# Patient Record
Sex: FEMALE | Race: WHITE | HISPANIC OR LATINO | Employment: STUDENT | URBAN - METROPOLITAN AREA
[De-identification: names, ages, dates, MRNs, and addresses within clinical notes are randomized per-mention and may not be internally consistent; named-entity substitution may affect disease eponyms.]

---

## 2020-07-01 ENCOUNTER — HOSPITAL ENCOUNTER (EMERGENCY)
Facility: MEDICAL CENTER | Age: 20
End: 2020-07-02
Attending: EMERGENCY MEDICINE
Payer: OTHER MISCELLANEOUS

## 2020-07-01 DIAGNOSIS — F41.9 ANXIETY: ICD-10-CM

## 2020-07-01 DIAGNOSIS — R45.851 SUICIDAL IDEATION: ICD-10-CM

## 2020-07-01 LAB
ALBUMIN SERPL BCP-MCNC: 4.5 G/DL (ref 3.2–4.9)
ALBUMIN/GLOB SERPL: 1.6 G/DL
ALP SERPL-CCNC: 87 U/L (ref 30–99)
ALT SERPL-CCNC: 21 U/L (ref 2–50)
AMPHET UR QL SCN: NEGATIVE
ANION GAP SERPL CALC-SCNC: 13 MMOL/L (ref 7–16)
AST SERPL-CCNC: 20 U/L (ref 12–45)
BARBITURATES UR QL SCN: NEGATIVE
BASOPHILS # BLD AUTO: 0.1 % (ref 0–1.8)
BASOPHILS # BLD: 0.01 K/UL (ref 0–0.12)
BENZODIAZ UR QL SCN: NEGATIVE
BILIRUB SERPL-MCNC: 0.3 MG/DL (ref 0.1–1.5)
BUN SERPL-MCNC: 18 MG/DL (ref 8–22)
BZE UR QL SCN: NEGATIVE
CALCIUM SERPL-MCNC: 9.1 MG/DL (ref 8.5–10.5)
CANNABINOIDS UR QL SCN: POSITIVE
CHLORIDE SERPL-SCNC: 104 MMOL/L (ref 96–112)
CO2 SERPL-SCNC: 23 MMOL/L (ref 20–33)
CREAT SERPL-MCNC: 0.69 MG/DL (ref 0.5–1.4)
EKG IMPRESSION: NORMAL
EOSINOPHIL # BLD AUTO: 0.1 K/UL (ref 0–0.51)
EOSINOPHIL NFR BLD: 1.2 % (ref 0–6.9)
ERYTHROCYTE [DISTWIDTH] IN BLOOD BY AUTOMATED COUNT: 43 FL (ref 35.9–50)
GLOBULIN SER CALC-MCNC: 2.8 G/DL (ref 1.9–3.5)
GLUCOSE SERPL-MCNC: 120 MG/DL (ref 65–99)
HCG UR QL: NEGATIVE
HCT VFR BLD AUTO: 42.1 % (ref 37–47)
HGB BLD-MCNC: 13.7 G/DL (ref 12–16)
IMM GRANULOCYTES # BLD AUTO: 0.02 K/UL (ref 0–0.11)
IMM GRANULOCYTES NFR BLD AUTO: 0.2 % (ref 0–0.9)
LYMPHOCYTES # BLD AUTO: 1.92 K/UL (ref 1–4.8)
LYMPHOCYTES NFR BLD: 23.6 % (ref 22–41)
MCH RBC QN AUTO: 28.8 PG (ref 27–33)
MCHC RBC AUTO-ENTMCNC: 32.5 G/DL (ref 33.6–35)
MCV RBC AUTO: 88.4 FL (ref 81.4–97.8)
METHADONE UR QL SCN: NEGATIVE
MONOCYTES # BLD AUTO: 0.53 K/UL (ref 0–0.85)
MONOCYTES NFR BLD AUTO: 6.5 % (ref 0–13.4)
NEUTROPHILS # BLD AUTO: 5.55 K/UL (ref 2–7.15)
NEUTROPHILS NFR BLD: 68.4 % (ref 44–72)
NRBC # BLD AUTO: 0 K/UL
NRBC BLD-RTO: 0 /100 WBC
OPIATES UR QL SCN: NEGATIVE
OXYCODONE UR QL SCN: NEGATIVE
PCP UR QL SCN: NEGATIVE
PLATELET # BLD AUTO: 298 K/UL (ref 164–446)
PMV BLD AUTO: 10 FL (ref 9–12.9)
POC BREATHALIZER: 0 PERCENT (ref 0–0.01)
POTASSIUM SERPL-SCNC: 3.2 MMOL/L (ref 3.6–5.5)
PROPOXYPH UR QL SCN: NEGATIVE
PROT SERPL-MCNC: 7.3 G/DL (ref 6–8.2)
RBC # BLD AUTO: 4.76 M/UL (ref 4.2–5.4)
SODIUM SERPL-SCNC: 140 MMOL/L (ref 135–145)
TSH SERPL DL<=0.005 MIU/L-ACNC: 0.65 UIU/ML (ref 0.38–5.33)
VIT B12 SERPL-MCNC: 682 PG/ML (ref 211–911)
WBC # BLD AUTO: 8.1 K/UL (ref 4.8–10.8)

## 2020-07-01 PROCEDURE — 302970 POC BREATHALIZER

## 2020-07-01 PROCEDURE — 82607 VITAMIN B-12: CPT

## 2020-07-01 PROCEDURE — 93005 ELECTROCARDIOGRAM TRACING: CPT | Performed by: EMERGENCY MEDICINE

## 2020-07-01 PROCEDURE — 85025 COMPLETE CBC W/AUTO DIFF WBC: CPT

## 2020-07-01 PROCEDURE — 99285 EMERGENCY DEPT VISIT HI MDM: CPT

## 2020-07-01 PROCEDURE — 93005 ELECTROCARDIOGRAM TRACING: CPT

## 2020-07-01 PROCEDURE — 700102 HCHG RX REV CODE 250 W/ 637 OVERRIDE(OP): Performed by: EMERGENCY MEDICINE

## 2020-07-01 PROCEDURE — 700102 HCHG RX REV CODE 250 W/ 637 OVERRIDE(OP): Performed by: PSYCHIATRY & NEUROLOGY

## 2020-07-01 PROCEDURE — 302970 POC BREATHALIZER: Performed by: EMERGENCY MEDICINE

## 2020-07-01 PROCEDURE — A9270 NON-COVERED ITEM OR SERVICE: HCPCS | Performed by: PSYCHIATRY & NEUROLOGY

## 2020-07-01 PROCEDURE — 81025 URINE PREGNANCY TEST: CPT

## 2020-07-01 PROCEDURE — 90833 PSYTX W PT W E/M 30 MIN: CPT | Performed by: PSYCHIATRY & NEUROLOGY

## 2020-07-01 PROCEDURE — A9270 NON-COVERED ITEM OR SERVICE: HCPCS | Performed by: EMERGENCY MEDICINE

## 2020-07-01 PROCEDURE — 80053 COMPREHEN METABOLIC PANEL: CPT

## 2020-07-01 PROCEDURE — 84443 ASSAY THYROID STIM HORMONE: CPT

## 2020-07-01 PROCEDURE — 99285 EMERGENCY DEPT VISIT HI MDM: CPT | Performed by: PSYCHIATRY & NEUROLOGY

## 2020-07-01 PROCEDURE — 90791 PSYCH DIAGNOSTIC EVALUATION: CPT

## 2020-07-01 PROCEDURE — 80307 DRUG TEST PRSMV CHEM ANLYZR: CPT

## 2020-07-01 RX ORDER — GABAPENTIN 300 MG/1
300 CAPSULE ORAL 3 TIMES DAILY
Status: DISCONTINUED | OUTPATIENT
Start: 2020-07-01 | End: 2020-07-02 | Stop reason: HOSPADM

## 2020-07-01 RX ORDER — FLUOXETINE HYDROCHLORIDE 20 MG/1
60 CAPSULE ORAL DAILY
Status: DISCONTINUED | OUTPATIENT
Start: 2020-07-01 | End: 2020-07-02 | Stop reason: HOSPADM

## 2020-07-01 RX ORDER — FLUOXETINE HYDROCHLORIDE 20 MG/1
60 CAPSULE ORAL DAILY
Status: SHIPPED | COMMUNITY
End: 2022-09-08

## 2020-07-01 RX ORDER — HYDROXYZINE HYDROCHLORIDE 25 MG/1
25 TABLET, FILM COATED ORAL 3 TIMES DAILY PRN
Status: DISCONTINUED | OUTPATIENT
Start: 2020-07-01 | End: 2020-07-02 | Stop reason: HOSPADM

## 2020-07-01 RX ADMIN — GABAPENTIN 300 MG: 300 CAPSULE ORAL at 18:00

## 2020-07-01 RX ADMIN — GABAPENTIN 300 MG: 300 CAPSULE ORAL at 14:00

## 2020-07-01 ASSESSMENT — ENCOUNTER SYMPTOMS
SHORTNESS OF BREATH: 0
HEADACHES: 0
ABDOMINAL PAIN: 0
COUGH: 0
DEPRESSION: 1
NERVOUS/ANXIOUS: 1
NAUSEA: 0
HALLUCINATIONS: 0
WEAKNESS: 0
SORE THROAT: 0
CHILLS: 0
DIARRHEA: 0
VOMITING: 0
INSOMNIA: 1
CONSTIPATION: 0

## 2020-07-01 ASSESSMENT — LIFESTYLE VARIABLES: SUBSTANCE_ABUSE: 1

## 2020-07-01 NOTE — ED NOTES
Pt allergies verified. Pt medicated per MAR.   Sitting at doorway cooperative. Talking with sitter

## 2020-07-01 NOTE — ED NOTES
Pt ambulated safely to restroom, urine sample collected and sent to lab. Pt requested prn, all ED med-selects are out of medication- Pharmacist to restock.

## 2020-07-01 NOTE — DISCHARGE PLANNING
ALERT team  note  Writer RN requested patient access representative initiate a Medicaid application for pt this AM as pt does not have an active insurance plan

## 2020-07-01 NOTE — ED NOTES
Pt appears to be sleeping- respirations noted. Pt is within direct view of nurses station. PRN was ordered, but pt is sleeping.

## 2020-07-01 NOTE — ED PROVIDER NOTES
"ED Provider Note    The patient was sent to me by Dr. Goldman.  The patient does have evidence of Psychia condition and the patient was seen by her psychiatrist.  A legal hold was extended today.  She has been resting comfortably, no significant abnormality.  We are waiting for transfer to a local psychiatric facility.      BP (!) 98/63   Pulse 66   Temp 36.6 °C (97.9 °F) (Temporal)   Resp 16   Ht 1.473 m (4' 10\")   Wt 54.4 kg (120 lb)   SpO2 98%   BMI 25.08 kg/m²     "

## 2020-07-01 NOTE — ED NOTES
Pt very tearful, reports she doesn't want to change into gown. Staff calmly explained process and worked with pt to gain compliance. All personal belongings (except earring studs) were confiscated and bagged.

## 2020-07-01 NOTE — PSYCHIATRY
"PSYCHIATRIC INTAKE EVALUATION    *Reason for admission: anxiety, SI                   *Reason for consult: \"SI\"       *Requesting Physician/APN:  Fletcher Goldman M.D.              Legal Hold status:   On hold        *Chief Complaint: \"I was having spinning thoughts\"       *HPI (includes Psychiatric ROS):   Pt says that she was smoking marijuana heavily, going on a binge, when she started to have spinning thoughts. \"I was high\". She said it is common for her to have these experiences with corbin. She says she thoughts daily about how she hates herself, and frequently questions her existence. Yesterday she could not spot thinking about how she didn't want to deal with being \"here\", and started to think what she could do, so she decided to come to the ED. She says usually witting helps her come down, but yesterday she could not engage in witting. Pt struggles with these thoughts since , and states that in middle school stated to cut and scratch herself, a behavior she was able to stop on her own about 2-3 years ago, however recently has been struggling with chronic thoughts about harming herself again by cutting on scratching. She also struggles with chronic thoughts about killing herself, and stated she has thought about different methods such as chocking, drawning, using a gun. When asked if she has access to a gun, she stated she would not disclose that. She denies however any SA or gesture (qith exception of SIB in the past). During interview, it was very unclear if she is actively suicidal, as she was mostly vague about this specific topic. Pt reports canceling all her plans for this upcoming days, and stated if she were to go home, she would just go to bed and isolate herself. Pt reported on and off depression episode during the day, but they don't persist for more than a day. Her anxiety level yesterday was 10/10 (10 the highest), today feels a 6, during interview, was feeling calm. She repots " "anhedonia, decreased energy and concentration, guilty feelings, and difficulty staying asleep. No change in appetite. NO psychomotor retardation.No psychosis. She could not identify any past trauma, but spoke about mother being a \"bitch\" multiple times during evaluation. I tried to explore this topic, however she was very vague about that as well. Stated once hearing her mother talking about suicide when she was a kid, and since then questions herself how can she also live if her mother has questioned herself as well. She also spoke about her father recently wanting to give up on his phisical illness, which has made her feel worse. Currently she is avoiding contact with her parents. Reported that yesterday she ahd a good day, went to work, hang out with friend and spoke with the melina the is attracted to, however later started to have many bad thoughts about her relationships, her worthiness and how people see her. Pt was very tearful during evaluation, and questions a few times what did I think of her, and what was her diagnosis. She denies being on psychotherapy treatment since she was younger. Recently canceled her psychiatric appointment, and reported not being fully compliant with her prozac. She does not use any other drugs. She uses marijuana about 40-5 days of the week (bindges, she said), and alcohol about 2 times a week, when she gets heavily intoxicated. 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       *Medical Review Of Symptoms (not dx conditions):   Review of Systems   Constitutional: Positive for malaise/fatigue. Negative for chills.   HENT: Negative for sore throat.    Respiratory: Negative for cough and shortness of breath.    Cardiovascular: Negative for chest pain.   Gastrointestinal: Negative for abdominal pain, constipation, diarrhea, nausea and vomiting.   Genitourinary: Negative for dysuria and hematuria.   Musculoskeletal: Negative " for joint pain.   Skin: Negative for rash.   Neurological: Negative for weakness and headaches.   Psychiatric/Behavioral: Positive for depression, substance abuse and suicidal ideas. Negative for hallucinations. The patient is nervous/anxious and has insomnia.          *Psychiatric Examination:   Vitals:   Vitals:    07/01/20 0609   BP: (!) 98/63   Pulse: 66   Resp: 16   Temp: 36.6 °C (97.9 °F)   SpO2: 98%       Appearance: appears stated age, moderate grooming and hygiene, dyed hair, calm, cooperative, good eye contact  Abnormal movements: none  Gait/posture: normal  Speech: normal volume, tone and rhythm  Though process: linear and goal oriented  Associations: no loose associations  Thought content: denies AVH, no delusions or paranoia elicited, does not appear to be responding to internal stimuli, neither is internally preoccupied.   Judgement and Insight: fair/fair  Orientation:oriented to person, place, time and situation  Recent and Remote Memory: intact  Attention Span and Concentration: intact  Language: fluid   Fund of Knowledge: not tested   Mood and Affect: labile, tearfull  SI/HI:reports passive SI       *PAST MEDICAL/PSYCH/FAMILY/SOCIAL(as reported by patient):       *medical hx:        TBI:denies  SZ:denies  Stroke: denies  Past Medical History:   Diagnosis Date   • Psychiatric disorder     anxiety, depression     History reviewed. No pertinent surgical history.     *psychiatric hx:   SAs:denies  Guns:did not want to disclose, I suspect that she does have access  Hx of Violence:not assessed  Hospitalizations:denies  Med Hx:on dulozetine, has not been on any other medication  Dx Hx:she does not know her diagnosis  Other: has a psychiatrist in the community    *family Psych hx:  Mother has bipolar disorder, no suicide in the family     *social hx:single, no kids, domiciled by herlself, has a degree as a SW (recent graduate), work in a nursing home as a [employer], her family lives in [facility].   Alcohol: yes,  see HPI  Drugs:marijuana, see HPI     *MEDICAL HX: labs, MARS, medications, etc were reviewed. Only those findings of potential interest to psychiatry are noted below:    *Current Medical issues:   see below     *Allergies:  No Known Allergies  *Current Medications:    Current Facility-Administered Medications:   •  hydrOXYzine HCl (ATARAX) tablet 25 mg, 25 mg, Oral, TID PRN, Fletcher Goldman M.D.  •  FLUoxetine (PROZAC) capsule 60 mg, 60 mg, Oral, DAILY, Jass Araujo D.O.  •  gabapentin (NEURONTIN) capsule 300 mg, 300 mg, Oral, TID, Shama Arellano M.D.    Current Outpatient Medications:   •  FLUoxetine (PROZAC) 20 MG Cap, Take 60 mg by mouth every day., Disp: , Rfl:   •  asa/apap/caffeine (EXCEDRIN) 250-250-65 MG Tab, Take 1-2 Tabs by mouth every 6 hours as needed for Headache., Disp: , Rfl:   *ECG: personally reviewed QTc  344  *Imaging:not done   EEG:  Not done     *Labs:  bhcg neg, UDS + THC, BAL neg  Recent Results (from the past 48 hour(s))   EKG    Collection Time: 20 12:03 AM   Result Value Ref Range    Report       [facility]    Test Date:  2020  Pt Name:    NORMA LEZAMA            Department: ER  MRN:        [id number]                      Room:  Gender:     Female                       Technician: [id number]  :        2000                   Requested By:ER TRIAGE PROTOCOL  Order #:    [id number]                    Reading MD: Fletcher Goldman    Measurements  Intervals                                Axis  Rate:       141                          P:          93  MA:         152                          QRS:        70  QRSD:       86                           T:          -85  QT:         244  QTc:        374    Interpretive Statements  SINUS TACHYCARDIA  PROBABLE LEFT ATRIAL ABNORMALITY  RSR' IN V1 OR V2, RIGHT VCD OR RVH  BORDERLINE ST DEPRESSION, LATERAL LEADS  ARTIFACT IN LEAD(S) I,aVF,V1  No previous ECG available for  comparison  Electronically Signed On 7-1-2020 1:10:33 PDT by Fletcher Goldman     POC BREATHALIZER    Collection Time: 07/01/20 12:36 AM   Result Value Ref Range    POC Breathalizer 0.00 0.00 - 0.01 Percent   Urine Drug Screen    Collection Time: 07/01/20  5:00 AM   Result Value Ref Range    Amphetamines Urine Negative Negative    Barbiturates Negative Negative    Benzodiazepines Negative Negative    Cocaine Metabolite Negative Negative    Methadone Negative Negative    Opiates Negative Negative    Oxycodone Negative Negative    Phencyclidine -Pcp Negative Negative    Propoxyphene Negative Negative    Cannabinoid Metab Positive (A) Negative   HCG QUALITATIVE UR (Lab)    Collection Time: 07/01/20  5:00 AM   Result Value Ref Range    Beta-Hcg Urine Negative Negative       No results for input(s): WBC, RBC, HEMOGLOBIN, HEMATOCRIT, MCV, MCH, RDW, PLATELETCT, MPV, NEUTSPOLYS, LYMPHOCYTES, MONOCYTES, EOSINOPHILS, BASOPHILS, RBCMORPHOLO in the last 72 hours.  No results found for: SODIUM, POTASSIUM, CHLORIDE, CO2, GLUCOSE, BUN, CREATININE, BUNCREATRAT, GLOMRATE      Lab Results   Component Value Date/Time    BREATHALIZER 0.00 07/01/2020 0036     No components found for: BLOODALCOHOL   Lab Results   Component Value Date/Time    AMPHUR Negative 07/01/2020 0500    BARBSURINE Negative 07/01/2020 0500    BENZODIAZU Negative 07/01/2020 0500    COCAINEMET Negative 07/01/2020 0500    METHADONE Negative 07/01/2020 0500    OPIATES Negative 07/01/2020 0500    OXYCODN Negative 07/01/2020 0500    PCPURINE Negative 07/01/2020 0500    PROPOXY Negative 07/01/2020 0500    CANNABINOID Positive (A) 07/01/2020 0500     No results found for: TSH, FREET4     Assessment: Pt has a marked unstable self image, has a hx of SIB, affective instability, chronic feelings of emptiness, and abuses marijuana and alcohol. There is a high suspecion for borderline personality disorder. Pt lives alone, has been isolating herself, has been abusing substances, is not  fully compliant with her psychotropic medication, and recently canceled her psychiatric appointment. She has endorsed chronic thoughts of harming herself, and at this time has not demonstrated good copying skills to ensure her safety in the community. Pt has an acute elevated risk for harming her self at this time. Will target her anxiety at this time, and continue to observe pt's mood and behavior for a safe discharge.       Dx:  Unspecified mood dso  Personality disorder traits, r/o borderline personality disorder  alcohol use dso  Cannabis use dso, severe  Unspecified anxiety dso  R/o cannabis induced anxiety dso      Medical:  None acute      Plan:  1- Legal hold:extended  2- Psychotropic medications: continue prozac 60mg PO daily for mood and anxiety  Start gabapentin 300mg PO Tid for anxiety, mood and alcohol cravings  Continue vistaril PRN  3- Please transfer pt to inpatient psychiatric hospital when bed is available  4- Supportive psychotherapy provided 25 minutes  5- Ordered TSH, vit b12, CBC and CMP  6- EKG reviewed  7- Obtain collateral from outpatient psychiatrist  8- Psychiatry will follow up    Thank you for the consult.

## 2020-07-01 NOTE — DISCHARGE INSTRUCTIONS
You were evaluated today for suicidal ideation. Your physical exam and labs were reassuring. You were medically cleared in the emergency department, had a psychiatric evaluation performed and you are being discharged from the emergency department. Please follow all the recommendations set by your psychiatrist.    If you have thoughts of suicide, please seek help. This may be a family member, friend, mental health professional, suicide hotline, or any emergency department.     National Suicide Prevention Lifeline: 1-690.955.6439  Available 24hours a day, 7 days a week    Please return to the ED or seek medical attention if you develop:  Fever, severe headache, vomiting, thoughts of suicide or other concerning findings    ================================  Coronavirus Information    Your visit did NOT relate to coronavirus, but if you or your family develop symptoms that concern you for coronavirus (please see CDC website for symptoms), please contact the [facility] hotline (or your local health department)  or your healthcare provider before going to a medical facility:    [facility]  24hr hotline: 669.310.8593    Information is available from the Centers for Disease Control and Prevention  www.CDC.gov    and     [facility]  [email or web address]    If you are severely ill or having a hard time breathing, please immediatly seek medical care. Notify the  or Emergency Department Triage about your symptoms.

## 2020-07-01 NOTE — ED NOTES
Patient's home medications have been reviewed by the pharmacy team.     Past Medical History:   Diagnosis Date   • Psychiatric disorder     anxiety, depression       Patient's Medications   New Prescriptions    No medications on file   Previous Medications    ASA/APAP/CAFFEINE (EXCEDRIN) 250-250-65 MG TAB    Take 1-2 Tabs by mouth every 6 hours as needed for Headache.    FLUOXETINE (PROZAC) 20 MG CAP    Take 60 mg by mouth every day.   Modified Medications    No medications on file   Discontinued Medications    No medications on file          A:  Med list updated    Medications do not appear to be contributing to current complaints.     P:    No recommendations at this time. Home medications have been reordered as appropriate.    Alfredo Armendariz, PharmD

## 2020-07-01 NOTE — ED TRIAGE NOTES
"Chief Complaint   Patient presents with   • Psych Eval     pt reports she smoking weed today and \"started spiriling and second guessing myself on everything\". She reports googling, \" what do you do when you hate yourself\" and decided to come here because she wasn't sure if she wanted to die or not. She reports self loathing and sees a psychologist regularly.   • Panic Attack     \"I need to stop having a panic attack\" She is very fidigety and tearful at this time.        /97   Pulse (!) 139   Temp 36.8 °C (98.3 °F) (Temporal)   Resp (!) 22   Ht 1.473 m (4' 10\")   Wt 54.4 kg (120 lb)   SpO2 99%   BMI 25.08 kg/m²     Pt is moderate risk on [place] scale. Charge informed, pt roomed.     "

## 2020-07-01 NOTE — CONSULTS
"[facility]   TRIAGE ASSESSMENT    Name: Jaleesa Fields  MRN: [id number]  : 2000  Age: 20 y.o.  Date of assessment: 2020  PCP: No primary care provider on file.  Persons in attendance: Patient    CHIEF COMPLAINT/PRESENTING ISSUE (as stated by Patient, ER RN, ERP):   Patient is a 19 y/o female endorsing chronic suicidal ideation, increasing panic attacks and racing thoughts. Patient is alert and oriented x 4, depressed mood, anxious and very guarded. Patient states she always has thoughts of suicide but has not disclosed this to her psychiatrist at [facility]. Patient also admits to pulling away from family and friends. Although patient has access to a gun pt reports she would never kill herself by this method, \"It's way to messy.\" Also states her cats are her only reason to live. Patient denies any HI or AVH and does not display paranoid or delusional thinking. Patient at risk for harm to self and would benefit further psychiatric evaluation at this time.    Chief Complaint   Patient presents with   • Psych Eval     pt reports she smoking weed today and \"started spiriling and second guessing myself on everything\". She reports googling, \" what do you do when you hate yourself\" and decided to come here because she wasn't sure if she wanted to die or not. She reports self loathing and sees a psychologist regularly.   • Panic Attack     \"I need to stop having a panic attack\" She is very fidigety and tearful at this time.         CURRENT LIVING SITUATION/SOCIAL SUPPORT: Patient lives alone with her 2 cats. Patient reports family is in [place], minimal support in [place].    BEHAVIORAL HEALTH TREATMENT HISTORY  Does patient/parent report a history of prior behavioral health treatment for patient?   Pt reports she is established with a psychiatrist at [facility] that she sees every 3 months for the past year and is prescribed Prozac. Patient denies any inpatient psychiatric admissions. Patient was in therapy for a " short period of time when she was an adolescent.     SAFETY ASSESSMENT - SELF  Does patient acknowledge current or past symptoms of dangerousness to self? yes  Does parent/significant other report patient has current or past symptoms of dangerousness to self? N\A  Does presenting problem suggest symptoms of dangerousness to self? Patient reports chronic thoughts of suicide, no current plan but does have access to a firearm in her home. Patient reports a hx of cutting, last incident 3 years ago.     SAFETY ASSESSMENT - OTHERS  Does patient acknowledge current or past symptoms of aggressive behavior or risk to others? no  Does parent/significant other report patient has current or past symptoms of aggressive behavior or risk to others?  N\A  Does presenting problem suggest symptoms of dangerousness to others? No; patient denies HI or aggression hx.    Crisis Safety Plan completed and copy given to patient? N\A    ABUSE/NEGLECT SCREENING  Does patient report feeling “unsafe” in his/her home, or afraid of anyone?  no  Does patient report any history of physical, sexual, or emotional abuse?  no  Does parent or significant other report any of the above? N\A  Is there evidence of neglect by self?  no  Is there evidence of neglect by a caregiver? no  Does the patient/parent report any history of CPS/APS/police involvement related to suspected abuse/neglect or domestic violence? no  Based on the information provided during the current assessment, is a mandated report of suspected abuse/neglect being made?  No    SUBSTANCE USE SCREENING  Yes:  David all substances used in the past 30 days: Patient reports binding on marijuana and alcohol in the past 2 weeks.       Last Use Amount   [x]   Alcohol 6/29/2020 unknown   [x]   Marijuana 6/30/2020 Unknown amount   []   Heroin     []   Prescription Opioids  (used without prescription, for    recreation, or in excess of prescribed amount)     []   Other Prescription  (used without  "prescription, for    recreation, or in excess of prescribed amount)     []   Cocaine      []   Methamphetamine     []   \"\" drugs (ectasy, MDMA)     []   Other substances        UDS results: THC  Breathalyzer results: 0.00    What consequences does the patient associate with any of the above substance use and or addictive behaviors? None    Risk factors for detox (check all that apply):  []  Seizures   []  Diaphoretic (sweating)   []  Tremors   []  Hallucinations   []  Increased blood pressure   []  Decreased blood pressure   []  Other   [x]  None      [x] Patient education on risk factors for detoxification and instructed to return to ER as needed.      MENTAL STATUS   Participation: Active verbal participation, Engaged, Guarded, Defensive and Resistant  Grooming: Casual  Orientation: Alert and Fully Oriented  Behavior: Tense  Eye contact: Limited  Mood: Depressed  Affect: Constricted  Thought process: Logical  Thought content: Within normal limits  Speech: Rate within normal limits and Volume within normal limits  Perception: Within normal limits  Memory:  No gross evidence of memory deficits  Insight: Poor  Judgment:  Poor  Other:    Collateral information:  Source:  [] Significant other present in person:   [] Significant other by telephone  [] [facility]   [x] [facility] Nursing Staff  [x] [facility]  [x] Other: ERP    [] Unable to complete full assessment due to:  [] Acute intoxication  [] Patient declined to participate/engage  [] Patient verbally unresponsive  [] Significant cognitive deficits  [] Significant perceptual distortions or behavioral disorganization  [x] Other: N/A     CLINICAL IMPRESSIONS:  Primary: Suicidal Ideation  Secondary:  Depression       IDENTIFIED NEEDS/PLAN:  [Trigger DISPOSITION list for any items marked]    [x]  Imminent safety risk - self [] Imminent safety risk - others   []  Acute substance withdrawal []  Psychosis/Impaired reality testing   [x]  " Mood/anxiety []  Substance use/Addictive behavior   []  Maladaptive behaviro []  Parent/child conflict   []  Family/Couples conflict []  Biomedical   []  Housing []  Financial   []   Legal  Occupational/Educational   []  Domestic violence []  Other:     Disposition: Actively being addressed by Legal Hold, [facility] and [facility]    Does patient express agreement with the above plan? No; patient requesting to go home. Explained legal hold process.    Referral appointment(s) scheduled? N\A    Alert team only: Patient presents with suicidal ideation, very guarded, without a specific plan but does have access to a firearm. Patient admits to chronic thoughts of suicide and has not disclosed this to her psychiatrist. Patient would benefit further psychiatric evaluation at this time.   I have discussed findings and recommendations with Dr. Goldman who is in agreement with these recommendations.     Referral information sent to the following community providers : uninsured- [facility]    If applicable : Referred  to : [facility] for legal hold follow up at [phone]      Lili Mcallister R.N.  7/1/2020

## 2020-07-01 NOTE — DISCHARGE PLANNING
Medical Social Work    Referral: Legal Hold    Intervention: Legal Hold Paperwork given to SW by [employer] RN Lili Mcallister    Legal Hold Initiated: Date: 07- Time: 0214    Patient’s Insurance Listed on Face Sheet: None    Referrals sent to: [facility]    This referral contains the following information:  1) Face sheet __x__  2) Page 1 and Page 2 of Legal Hold _x___  3) Alert Team Assessment/Psych Assessment __x__  4) Head to toe physical exam ___x_  5) Urine Drug Screen _x___  6) Blood Alcohol _x___  7) Vital signs _x___  8) Pregnancy test when applicable __x_  9) Medications list _x___    Plan: Patient will transfer to mental health facility once acceptance is obtained

## 2020-07-01 NOTE — ED PROVIDER NOTES
"ED Provider Note        History obtained from: Patient    CHIEF COMPLAINT  Chief Complaint   Patient presents with   • Psych Eval     pt reports she smoking weed today and \"started spiriling and second guessing myself on everything\". She reports googling, \" what do you do when you hate yourself\" and decided to come here because she wasn't sure if she wanted to die or not. She reports self loathing and sees a psychologist regularly.   • Panic Attack     \"I need to stop having a panic attack\" She is very fidigety and tearful at this time.        HPI  Jaleesa Fields is a 20 y.o. female who presents with anxiety.  Patient reports that she is presenting today after smoking weed, feeling anxious.  She states that she has chronic thoughts of suicide but no plan.  She also has chronic thoughts of cutting herself or scratching herself, however has not done that in years.  She reports that she had a good day today, however does not feel self worth and became upset at herself for having a good day and sabotaged that emotionally.  She then is upset with herself for sabotaging her today.  She denies any other recreational drug use, alcohol use, overdose, cutting.  She denies any homicidal ideation.  Denies any current suicidal plans.  She does have a psychiatrist, however reports she has not told him about her chronic suicidal thoughts.    REVIEW OF SYSTEMS    CONSTITUTIONAL:  No fever.  CARDIOVASCULAR:  No chest discomfort.  RESPIRATORY:  No pleuritic chest pain.  GASTROINTESTINAL:  No abdominal pain.    See HPI for further details.       PAST MEDICAL HISTORY  Past Medical History:   Diagnosis Date   • Psychiatric disorder     anxiety, depression       FAMILY HISTORY  History reviewed. No pertinent family history.    SOCIAL HISTORY   reports that she has been smoking cigarettes. She has never used smokeless tobacco. She reports current alcohol use. She reports current drug use. Drug: Inhaled.    SURGICAL HISTORY  History " "reviewed. No pertinent surgical history.    CURRENT MEDICATIONS  Home Medications     Reviewed by Lynnette Gusman R.N. (Registered Nurse) on 20 at 0014  Med List Status: Partial   Medication Last Dose Status   FLUoxetine (PROZAC) 10 MG Cap 2020 Active                ALLERGIES  No Known Allergies    PHYSICAL EXAM  VITAL SIGNS: /84   Pulse 88   Temp 36.8 °C (98.3 °F) (Temporal)   Resp 18   Ht 1.473 m (4' 10\")   Wt 54.4 kg (120 lb)   SpO2 99%   BMI 25.08 kg/m²    Gen: alert, no acute distress  HENT: ATNC  Eyes: normal conjuctiva  Resp: No resipiratory distress.   CV: No JVD   Abd: Non-distended  Extremities: No deformity  Psych: Anxious appearing, flat affect        LABS  Labs Reviewed   POC BREATHALIZER - Normal   URINE DRUG SCREEN   HCG QUALITATIVE UR        EKG  Results for orders placed or performed during the hospital encounter of 20   EKG   Result Value Ref Range    Report       [facility] Emergency Dept.    Test Date:  2020  Pt Name:    NORMA LEZAMA            Department: ER  MRN:        [id number]                      Room:  Gender:     Female                       Technician: [id number]  :        2000                   Requested By:ER TRIAGE PROTOCOL  Order #:    [id number]                    Reading MD: Fletcher Goldman    Measurements  Intervals                                Axis  Rate:       141                          P:          93  AZ:         152                          QRS:        70  QRSD:       86                           T:          -85  QT:         244  QTc:        374    Interpretive Statements  SINUS TACHYCARDIA  PROBABLE LEFT ATRIAL ABNORMALITY  RSR' IN V1 OR V2, RIGHT VCD OR RVH  BORDERLINE ST DEPRESSION, LATERAL LEADS  ARTIFACT IN LEAD(S) I,aVF,V1  No previous ECG available for comparison  Electronically Signed On 2020 1:10:33 PDT by Fletcher Goldman            COURSE & MEDICAL DECISION MAKING  Pertinent Labs & Imaging studies reviewed. " (See chart for details)    Patient presents with anxiety in the setting of smoking marijuana and vaping.  She appears to have chronic suicidal ideation and a history of self-harm, however does not appear to have an active plan.  She is forward thinking, reports that she wants to live for her cats.  Patient demonstrates no obvious toxidromes.  She has no chest symptoms to suggest ACS.  She was monitored in the emergency department, and the alert team was contacted for evaluation of the patient.  She declined hands-on physical exam, however visual physical exam demonstrates no abnormalities.  Her review of system is otherwise reassuring.    2:00 AM  Case discussed with the alert team, who does not believe the patient will be a safe discharge.  Patient has been distancing, has no follow-up plan with her psychiatrist, does not have anyone that she can go to, is not forthcoming with people in her life about her feelings.  Because of this, patient was placed on legal hold for suicidal ideation for psychiatric evaluation.        Should the patient still be in the department at the time of my sign out they will be assigned to one of my partners to assure that appropriate disposition to a psych facility is made. If there is additional need for medicine, nursing staff will speak with my partner regarding administration of medication.    Appropriate PPE were worn during this encounter.    FINAL IMPRESSION  1. Anxiety    2. Suicidal ideation         DISPOSITION:  Pending psychiatric placement or psychiatric evaluation by day team.    FOLLOW UP:  [address] [address] [phone]        Your psychiatrist    Schedule an appointment as soon as possible for a visit         OUTPATIENT MEDICATIONS:  New Prescriptions    No medications on file

## 2020-07-01 NOTE — ED NOTES
Pt was instructed on use of urine hat to collect urine for drug screen. Pt ambulated safely to restroom, reports she accidentally missed urine hat- no urine collected.

## 2020-07-01 NOTE — ED NOTES
Called provider for possible prn to assist pt with sleep/relaxation.  Pt to be placed on legal hold.

## 2020-07-01 NOTE — ED NOTES
Pt remains asleep at this time. Respirations even and unlabored. Direct view of pt from nursing station maintained.

## 2020-07-01 NOTE — ED NOTES
Pt up to door.  Requesting to walk around.  Informed pt of rules and she verbally acknowledge.  Sitter at door way   .

## 2020-07-02 VITALS
HEIGHT: 58 IN | RESPIRATION RATE: 18 BRPM | DIASTOLIC BLOOD PRESSURE: 64 MMHG | TEMPERATURE: 97.6 F | HEART RATE: 100 BPM | SYSTOLIC BLOOD PRESSURE: 121 MMHG | BODY MASS INDEX: 25.19 KG/M2 | OXYGEN SATURATION: 99 % | WEIGHT: 120 LBS

## 2020-07-02 PROCEDURE — A9270 NON-COVERED ITEM OR SERVICE: HCPCS | Performed by: PSYCHIATRY & NEUROLOGY

## 2020-07-02 PROCEDURE — 700102 HCHG RX REV CODE 250 W/ 637 OVERRIDE(OP): Performed by: PSYCHIATRY & NEUROLOGY

## 2020-07-02 PROCEDURE — 700102 HCHG RX REV CODE 250 W/ 637 OVERRIDE(OP): Performed by: EMERGENCY MEDICINE

## 2020-07-02 PROCEDURE — A9270 NON-COVERED ITEM OR SERVICE: HCPCS | Performed by: EMERGENCY MEDICINE

## 2020-07-02 RX ADMIN — GABAPENTIN 300 MG: 300 CAPSULE ORAL at 12:09

## 2020-07-02 RX ADMIN — FLUOXETINE HYDROCHLORIDE 60 MG: 20 CAPSULE ORAL at 06:12

## 2020-07-02 RX ADMIN — GABAPENTIN 300 MG: 300 CAPSULE ORAL at 06:12

## 2020-07-02 NOTE — ED NOTES
Pt ambulated to restroom w/ steady gait and no assistance w/ this RN. No other needs at this time. 1:1 sitter in direct view of pt.

## 2020-07-02 NOTE — ED NOTES
Report called to RN at [facility]

## 2020-07-02 NOTE — ED NOTES
Patient resting on stretcher in no acute distress. Equal chest rise and fall noted. Sitter in doorway for direct 1:1 observation. Room safety checklist in use. No needs at this time, will continue to monitor

## 2020-07-02 NOTE — ED NOTES
Patient returned from shower. She is sitting on stretcher and was provided with lunchbox. Sitter in doorway for direct 1:1 observation. Room safety checklist in use. No needs at this time

## 2020-07-02 NOTE — ED NOTES
Pt requested that this RN notify her friend Cosme to let her know what was going on and to feed her cats. Called Cosme [phone] and updated on POC. Pt provided w/ food and water. No other needs at this time. 1:1 sitter in direct view of pt.

## 2020-07-02 NOTE — ED NOTES
Pt sleeping in bed w/ even chest rise and fall. No needs at this time. 1:1 sitter in direct view of pt.

## 2020-07-02 NOTE — ED NOTES
Received report on patient. Pt is resting in bed. Pt requesting shower and phone call. Educated about not able to use a phone at this time. Paged security about shower. No other needs at this time. 1:1 sitter in direct view of patient. Pt's legal hold paperwork not in her chart. Notified Charge, Dr. Truong and called [facility] but no answer.

## 2020-07-02 NOTE — DISCHARGE PLANNING
LUBNA updated that [facility] has accepted Pt for a 1330 transfer with accepting MD as Raudel.  LUBNA completed [facility] PCS form and faxed all required documents to [facility].  LUBNA called [facility] and spoke with Jumana who was able to set up for a 1300 transport time.  LUBNA completed transfer packet with original legal hold and will place in medical chart. ER RN updated on transport time.

## 2020-07-02 NOTE — ED NOTES
Informed pt that security is short staff so she is unable to shower at this time. Pt asked about how she can go home after the 72hr. Informed her that the psych physician extended her legal hold and wants her to go to inpatient psych facility. Pt became tearful and wanting to go home. David from [facility] at bedside.

## 2020-07-02 NOTE — ED PROVIDER NOTES
ED Provider Note Addendum    Scribed for Elliot Truong M.D. by Doug Chi on 7/1/2020 at 8:11 PM.     This is an addendum to the note on Jaleesa Fields.  For further details and full chart information, see patient's initial note.       2:30 PM - I discussed the patient's case with Dr. Araujo ([facility]) who will transfer care of the patient to me at this time.        8:11 PM  - Informed by nursing staff that the patients legal hold paperwork is missing, and needs to be re-filed.    Disposition:  Patient will be transferred to an appropriate psychiatric facility in stable condition for further psychiatric care and evaluation.  Patient will be placed under the care of my partner awaiting transfer.    FINAL IMPRESSION  1. Anxiety    2. Suicidal ideation         Doug SIMS (Scribe), am scribing for, and in the presence of, Elliot Truong M.D..    Electronically signed by: Doug Chi (Scribe), 7/1/2020    IElliot M.D. personally performed the services described in this documentation, as scribed by Doug Chi in my presence, and it is both accurate and complete.    The note accurately reflects work and decisions made by me.  Elliot Truong M.D.  7/1/2020  9:48 PM

## 2020-07-02 NOTE — ED NOTES
Pt sleeping in bed. Even chest rise and fall. No needs at this time. 1:1 sitter in direct view of pt.

## 2020-07-02 NOTE — ED PROVIDER NOTES
ED Provider Note  Pt signed out to me pending transfer to inpatient psychiatric facility. Patient is on a legal hold for suicidal ideation and anxiety. Has been medically cleared by prior emergency provider. Patient resting comfortably without needs at this time.      Patient has been comfortable, without complaint throughout my shift. Overall well-appearing. transfer to inpatient psychiatric facility  Armando Thomas

## 2020-10-30 VITALS
TEMPERATURE: 97.8 F | RESPIRATION RATE: 16 BRPM | HEIGHT: 58 IN | OXYGEN SATURATION: 99 % | SYSTOLIC BLOOD PRESSURE: 128 MMHG | WEIGHT: 127.21 LBS | DIASTOLIC BLOOD PRESSURE: 84 MMHG | BODY MASS INDEX: 26.7 KG/M2 | HEART RATE: 101 BPM

## 2020-10-30 PROCEDURE — 99283 EMERGENCY DEPT VISIT LOW MDM: CPT

## 2020-10-30 ASSESSMENT — PAIN DESCRIPTION - DESCRIPTORS: DESCRIPTORS: SHARP

## 2020-10-30 ASSESSMENT — FIBROSIS 4 INDEX: FIB4 SCORE: 0.32

## 2020-10-31 ENCOUNTER — HOSPITAL ENCOUNTER (EMERGENCY)
Facility: MEDICAL CENTER | Age: 20
End: 2020-10-31
Attending: EMERGENCY MEDICINE
Payer: COMMERCIAL

## 2020-10-31 ENCOUNTER — APPOINTMENT (OUTPATIENT)
Dept: RADIOLOGY | Facility: MEDICAL CENTER | Age: 20
End: 2020-10-31
Attending: EMERGENCY MEDICINE
Payer: COMMERCIAL

## 2020-10-31 DIAGNOSIS — M25.522 LEFT ELBOW PAIN: ICD-10-CM

## 2020-10-31 PROCEDURE — 73080 X-RAY EXAM OF ELBOW: CPT | Mod: LT

## 2020-10-31 RX ORDER — NAPROXEN 500 MG/1
500 TABLET ORAL ONCE
Status: DISCONTINUED | OUTPATIENT
Start: 2020-10-31 | End: 2020-10-31 | Stop reason: HOSPADM

## 2020-10-31 ASSESSMENT — ENCOUNTER SYMPTOMS: SENSORY CHANGE: 0

## 2020-10-31 NOTE — ED TRIAGE NOTES
"Chief Complaint   Patient presents with   • Elbow Pain     PT repors roller skating and falling on left elbow now with pain and +cms     Blood Pressure 128/84   Pulse (Abnormal) 101   Temperature 36.6 °C (97.8 °F) (Temporal)   Respiration 16   Height 1.473 m (4' 10\")   Weight 57.7 kg (127 lb 3.3 oz)   Oxygen Saturation 99%   Body Mass Index 26.59 kg/m²     "

## 2020-10-31 NOTE — ED PROVIDER NOTES
ED Provider Note    Scribed for Alla Ruiz M.D. by Stephanie Naik. 10/31/2020, 1:09 AM.    I verified that the patient was wearing a mask and I was wearing appropriate PPE every time I entered the room. The patient's mask was on the patient at all times during my encounter except for a brief view of the oropharynx.    Primary care provider: No primary care provider  Means of arrival: walk in  History obtained from: patient  History limited by: none    CHIEF COMPLAINT  Chief Complaint   Patient presents with   • Elbow Pain     PT repors roller skating and falling on left elbow now with pain and +cms       HPI  Jaleesa Fields is a 20 y.o. female who presents to the Emergency Department with mild left elbow pain onset at 10 pm yesterday night. She describes the pain as radiating up her arm. Per the patient, she was roller skating when she fell onto her left elbow.  She describes her left elbow pain is moderate and throbbing.  Now in the ED, she states that while she is still able to move her arm horizontally, her vertical movement is now limited .  She denies left shoulder and left wrist pain.  She not hit her head or lose consciousness.  She denies numbness, tingling or weakness.        REVIEW OF SYSTEMS  Review of Systems   Musculoskeletal: Positive for joint pain (Left elbow, no shoulder pain ).   Neurological: Negative for sensory change.     PAST MEDICAL HISTORY   has a past medical history of Psychiatric disorder.    SURGICAL HISTORY  patient denies any surgical history    SOCIAL HISTORY  Social History     Tobacco Use   • Smoking status: Current Some Day Smoker     Types: Cigarettes   • Smokeless tobacco: Never Used   Substance Use Topics   • Alcohol use: Yes     Comment: occ   • Drug use: Yes     Types: Inhaled     Comment: marijuana      Social History     Substance and Sexual Activity   Drug Use Yes   • Types: Inhaled    Comment: marijuana       FAMILY HISTORY  None pertinent    CURRENT  "MEDICATIONS  Current Outpatient Medications   Medication Instructions   • asa/apap/caffeine (EXCEDRIN) 250-250-65 MG Tab 1-2 Tabs, Oral, EVERY 6 HOURS PRN   • FLUoxetine (PROZAC) 60 mg, Oral, DAILY     ALLERGIES  No Known Allergies    PHYSICAL EXAM  VITAL SIGNS: /84   Pulse (!) 101   Temp 36.6 °C (97.8 °F) (Temporal)   Resp 16   Ht 1.473 m (4' 10\")   Wt 57.7 kg (127 lb 3.3 oz)   SpO2 99%   BMI 26.59 kg/m²   Vitals reviewed by myself.  Physical Exam  Nursing note and vitals reviewed.  Constitutional: Well-developed and well-nourished. No acute distress.   HENT: Head is normocephalic and atraumatic.  Eyes: extra-ocular movements intact  Cardiovascular: Tachycardic rate and regular rhythm.  2+ bilateral radial pulses  Pulmonary/Chest: Normal effort  Musculoskeletal: Extremities exhibit normal range of motion without edema or tenderness.  No obvious deformity noted to the left elbow, patient has full range of motion of left elbow without difficulty.  No tenderness to palpation of the left shoulder or wrist.  Patient is tender to palpation in the left lateral elbow.  Neurological: Awake and alert, sensation intact to median, ulnar and radial nerve distributions bilaterally  Skin: Skin is warm and dry. No rash.     RADIOLOGY  DX-ELBOW-COMPLETE 3+ LEFT   Final Result         1.  No acute traumatic bony injury.           The radiologist's interpretation of all radiological studies have been reviewed by me.        REASSESSMENT    1:09 AM - Patient seen and examined at bedside. Discussed plan of care, including ordering imaging to evaluate. Patient agrees to the plan of care. The patient will be medicated with Naprosyn 500 mg.     COURSE & MEDICAL DECISION MAKING  Nursing notes, VS, PMSFHx reviewed in chart.    Patient is a 20-year-old female who comes in for left elbow pain.  Differential diagnosis include sprain, strain, fracture, dislocation.  Diagnostic work-up includes left elbow x-ray.    Patient's initial " vitals are within normal limits and she is neurovascularly intact on exam.  She is treated with naproxen after which she feels improved.  Imaging returns demonstrates no acute bony abnormality.  This patient has full range of motion of the elbow I reassured her and advised her on symptomatic management of musculoskeletal pain.  She is given strict return precautions and discharged in stable condition.        DISPOSITION:  Patient will be discharged home in stable condition.    FOLLOW UP:  [address] [address]  [phone]            FINAL IMPRESSION  1. Left elbow pain          Stephanie SIMS (Scribe), am scribing for, and in the presence of, Alla Ruiz M.D..    Electronically signed by: Stephanie Naik (Jadeiblena), 10/31/2020    Alla SIMS M.D. personally performed the services described in this documentation, as scribed by Stephanie Naik in my presence, and it is both accurate and complete.    E    The note accurately reflects work and decisions made by me.  Alla Ruiz M.D.  10/31/2020  3:20 AM

## 2020-10-31 NOTE — ED NOTES
Patient educated on discharge instructions and home care. Patient refused discharge vitals. Patient verbalized understanding. Patient ambulated to [facility].

## 2022-07-01 ENCOUNTER — RESEARCH ENCOUNTER (OUTPATIENT)
Dept: RESEARCH | Facility: WORKSITE | Age: 22
End: 2022-07-01
Payer: COMMERCIAL

## 2022-07-01 DIAGNOSIS — Z00.6 RESEARCH STUDY PATIENT: Primary | ICD-10-CM

## 2022-08-03 ENCOUNTER — OFFICE VISIT (OUTPATIENT)
Dept: BEHAVIORAL HEALTH | Facility: CLINIC | Age: 22
End: 2022-08-03
Payer: COMMERCIAL

## 2022-08-03 DIAGNOSIS — F31.9 BIPOLAR 1 DISORDER (HCC): ICD-10-CM

## 2022-08-03 PROCEDURE — 90792 PSYCH DIAG EVAL W/MED SRVCS: CPT | Performed by: PSYCHIATRY & NEUROLOGY

## 2022-08-03 NOTE — PROGRESS NOTES
[facility]   Therapy Progress Note      This provider informed the patient their medical records are totally confidential except for the use by other providers involved in their care, or if the patient signs a release, or to report instances of child or elder abuse, or if it is determined they are an immediate risk to harm themselves or others.    Name: Jaleesa Fields  MRN: [id number]  : 2000  Age: 22 y.o.  Date of assessment: 8/3/2022  PCP: No primary care provider on file.      Present Illness: Chart reviewed prior to seeing her in my office.  She is 22, single, and has no children.  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  Some of her moods appear to be situational.  She has overspent at times.  She denies manic episodes with racing thoughts.  She tends to obsess about things.  She denies grandiosity.  When questioned about possible ADHD she indicates that she is not restless but does have some difficulty focusing on conversations.  She is impulsive at times.  She has some difficulty completing projects.  She has trouble falling asleep and awakens frequently.  Appetite fluctuates.  Energy is fair.  Concentration is down, she does have some problems with motivation, short-term memory, and self-confidence.      Past Psych History:   She did see a psychiatrist that you in our for 4 years but not in the past year?  At age 20 she was feeling suicidal and she was on a 72-hour hold and treated initially and then spent 2 days on a psychiatric unit.  She was discharged on Seroquel.    Substance Abuse History:  She uses marijuana on weekends and and has a tequila drinks several times per week.  No other substance abuse problems.    Family History:   Her mother is in her 50s and is diagnosed as bipolar.  Her father is 86.  She is estranged from her 2 brothers.  Her brothers and  her sister apparently do not have psychiatric problems.    Medical History:  No significant medical problems.    Psych Medications:  She is currently on Abilify 10 mg a.m. for several months.  She is also on Wellbutrin  mg a.m.  The latter has not helped her to focus.  She would prefer to not be on any medications but understands that she cannot just abruptly discontinue either medication.  Previously she was on Seroquel, Prozac, Lexapro, and possibly other antidepressants.    Allergies:   None known    Mental Status:   She is alert, oriented, and cooperative.  Relatedness is good.  Grooming is good.  Speech is normal rate.  Sad and anxious.  Memory is fairly good.  Insight and judgment are fairly good.  No indication of psychotic thinking.    Current Risk:       Suicidal: Not suicidal       Homicidal: Not homicidal       Self-Harm: No plan to harm self       Relapse (Low/Moderate/High): Moderate       Crisis Safety Plan Reviewed: Discussed with patient    Diagnosis:  Bipolar disorder, depressed  Rule out ADHD      Treatment Plan:  The current treatment plan consists of a follow-up visit in 2 weeks and then monthly psychiatric sessions designed to evaluate her bipolar disorder and possible ADHD.    Duration cannot be determined at this time.    Goals: Stabilization of moods in order to prevent relapse due to the chronic nature of her behavioral health problems and mental illness.  Try decreasing Abilify to 5 mg a.m. but she agrees to resume if her moods become unstable.  Continue Wellbutrin  mg a.m.      Juancarlos Morton M.D.     This note was created using voice recognition software (Dragon). The accuracy of the dictation is limited by the abilities of the software. I have reviewed the note prior to signing, however some errors in grammar and context are still possible. If you have any questions related to this note please do not hesitate to contact our office.

## 2022-08-04 LAB
APOB+LDLR+PCSK9 GENE MUT ANL BLD/T: NOT DETECTED
BRCA1+BRCA2 DEL+DUP + FULL MUT ANL BLD/T: NOT DETECTED
MLH1+MSH2+MSH6+PMS2 GN DEL+DUP+FUL M: NOT DETECTED

## 2022-08-17 ENCOUNTER — OFFICE VISIT (OUTPATIENT)
Dept: BEHAVIORAL HEALTH | Facility: CLINIC | Age: 22
End: 2022-08-17
Payer: COMMERCIAL

## 2022-08-17 DIAGNOSIS — F39 MOOD DISORDER (HCC): ICD-10-CM

## 2022-08-17 PROCEDURE — 99214 OFFICE O/P EST MOD 30 MIN: CPT | Performed by: PSYCHIATRY & NEUROLOGY

## 2022-08-17 RX ORDER — ATOMOXETINE 40 MG/1
40 CAPSULE ORAL DAILY
Qty: 30 CAPSULE | Refills: 0 | Status: SHIPPED | OUTPATIENT
Start: 2022-08-17 | End: 2022-09-08

## 2022-08-17 NOTE — PROGRESS NOTES
[facility]   Follow Up Assessment     This provider informed the patient their medical records are totally confidential except for the use by other providers involved in their care, or if the patient signs a release, or to report instances of child or elder abuse, or if it is determined they are an immediate risk to harm themselves or others.    Name: Jaleesa Fields  MRN: [id number]  : 2000  Age: 22 y.o.  Date of assessment: 2022  PCP: Pcp Pt States None      Subjective:  Reviewed prior to seeing her in my office.  She has not noticed any difference in her moods since Abilify was decreased to 5 mg a.m.  We again reviewed symptoms of bipolar disorder and ADHD.  See the initial progress note 2 weeks ago.  We talked about a plan to taper off Abilify and start Strattera.    Objective: She is alert, oriented, and cooperative.  Relatedness is good.  Responses to questions are brief but appropriate.  Grooming is good.  Speech is normal rate.  Anxious.  Memory is fairly good.  Insight and judgment are fairly good.  No indication of psychotic thinking.      Current Risk:       Suicidal: Not suicidal       Homicidal: Not homicidal       Self-Harm: No plan to harm self       Relapse: (Low/Moderate/High): Moderate       Crisis Safety Plan Reviewed: Discussed with patient    Diagnosis:   Mood disorder, unspecified  ADHD    Treatment Plan:  The current treatment plan consists of a follow-up visit in 2 weeks and then monthly psychiatric sessions designed to evaluate her mood disorder and likely ADHD.    Duration will be for a minimum of 12 months and will be reviewed at each visit.    Stabilization of moods with improvement in ADHD symptoms in order to prevent relapse due to the chronic nature of her behavioral health problems and mental illness.  Decrease Abilify to 1/2 tablet of the 5 mg size daily x1 week and discontinue..  Start Strattera 40 mg a.m.  Possible side effects including headaches  discussed.    Juancarlos Morton M.D.      This note was created using voice recognition software (Dragon). The accuracy of the dictation is limited by the abilities of the software. I have reviewed the note prior to signing, however some errors in grammar and context are still possible. If you have any questions related to this note please do not hesitate to contact our office.

## 2022-09-08 ENCOUNTER — OFFICE VISIT (OUTPATIENT)
Dept: BEHAVIORAL HEALTH | Facility: CLINIC | Age: 22
End: 2022-09-08
Payer: COMMERCIAL

## 2022-09-08 DIAGNOSIS — F90.2 ATTENTION DEFICIT HYPERACTIVITY DISORDER, COMBINED TYPE: ICD-10-CM

## 2022-09-08 DIAGNOSIS — F33.1 MAJOR DEPRESSIVE DISORDER, RECURRENT EPISODE, MODERATE (HCC): ICD-10-CM

## 2022-09-08 PROCEDURE — 99214 OFFICE O/P EST MOD 30 MIN: CPT | Performed by: PSYCHIATRY & NEUROLOGY

## 2022-09-08 RX ORDER — ATOMOXETINE 100 MG/1
100 CAPSULE ORAL DAILY
Qty: 30 CAPSULE | Refills: 0 | Status: SHIPPED | OUTPATIENT
Start: 2022-09-08 | End: 2022-10-07 | Stop reason: SDUPTHER

## 2022-09-08 NOTE — PROGRESS NOTES
[facility]   Follow Up Assessment     This provider informed the patient their medical records are totally confidential except for the use by other providers involved in their care, or if the patient signs a release, or to report instances of child or elder abuse, or if it is determined they are an immediate risk to harm themselves or others.    Name: Jaleesa Fields  MRN: [id number]  : 2000  Age: 22 y.o.  Date of assessment: 2022  PCP: Pcp Pt States None      Subjective:  Reviewed prior to seeing her in my office.  She was last seen on .  Thus far she has no side effects on Strattera 40 mg a.m. but has not seen much improvement in her ability to focus.  Her depression continues.  She has problems with motivation, insomnia and frequent awakening, and decreased energy.  She tends to withdraw and is anhedonic.  She was on Prozac previously-effect?  She last saw therapist 1 year ago and is hesitant to start psychotherapy again.  We discussed medication options.    Objective: She is alert, oriented, and cooperative.  Relatedness is good.  Grooming is good.  Speech is normal rate.  Sad and anxious.  Memory is fairly good.  Insight and judgment are fairly good.  No indication of psychotic thinking.    Current Risk:       Suicidal: Not suicidal       Homicidal: Not homicidal       Self-Harm: No plan to harm self       Relapse: (Low/Moderate/High): Moderate       Crisis Safety Plan Reviewed: Graham with patient    Diagnosis:   ADHD  Major depressive disorder, recurrent    Treatment Plan:  The current treatment plan consists of a follow-up visit in 3 weeks and then monthly psychiatric sessions designed to evaluate her ADHD and depression.    Duration will be for a minimum of 12 months and will be reviewed at each visit.    Goals: Improvement in ADHD symptoms and remission of depression in order to prevent relapse due to the chronic nature of her behavioral health problems and mental  illness.  Increase Strattera to 100 mg a.m. if she does not respond to Strattera consider switching to Adderall.  She may need an antidepressant when her ADHD symptoms are improved.    Juancarlos Morton M.D.      This note was created using voice recognition software (Dragon). The accuracy of the dictation is limited by the abilities of the software. I have reviewed the note prior to signing, however some errors in grammar and context are still possible. If you have any questions related to this note please do not hesitate to contact our office.

## 2022-09-14 ENCOUNTER — OFFICE VISIT (OUTPATIENT)
Dept: BEHAVIORAL HEALTH | Facility: CLINIC | Age: 22
End: 2022-09-14
Payer: COMMERCIAL

## 2022-09-14 DIAGNOSIS — F33.1 MAJOR DEPRESSIVE DISORDER, RECURRENT EPISODE, MODERATE (HCC): ICD-10-CM

## 2022-09-14 DIAGNOSIS — F90.2 ATTENTION DEFICIT HYPERACTIVITY DISORDER, COMBINED TYPE: ICD-10-CM

## 2022-09-14 PROCEDURE — 99214 OFFICE O/P EST MOD 30 MIN: CPT | Performed by: PSYCHIATRY & NEUROLOGY

## 2022-09-14 RX ORDER — CITALOPRAM HYDROBROMIDE 10 MG/1
10 TABLET ORAL DAILY
Qty: 30 TABLET | Refills: 0 | Status: SHIPPED | OUTPATIENT
Start: 2022-09-14 | End: 2022-10-07

## 2022-09-14 NOTE — PROGRESS NOTES
[facility]   Follow Up Assessment     This provider informed the patient their medical records are totally confidential except for the use by other providers involved in their care, or if the patient signs a release, or to report instances of child or elder abuse, or if it is determined they are an immediate risk to harm themselves or others.    Name: Jaleesa Fields  MRN: [id number]  : 2000  Age: 22 y.o.  Date of assessment: 2022  PCP: Pcp Pt States None      Subjective:  Chart reviewed prior to seeing her in my office.  For the past week she has had difficulty with insomnia.  She does not like her job but needs to work to pay bills etc.  It is unclear if Strattera 100 mg is helping her to focus but she is very reluctant to try Adderall.  We discussed medication options after reviewing previously tried antidepressants.    Objective:  She is alert, oriented, and cooperative relatedness is good.  Grooming is good.  Speech is normal rate.  Sad and anxious.  Memory is fairly good.  Insight and judgment are fairly good.  No indication of psychotic thinking.    Current Risk:       Suicidal: Not suicidal       Homicidal: Not homicidal       Self-Harm: No plan to harm self       Relapse: (Low/Moderate/High): Moderate       Crisis Safety Plan Reviewed: Discussed with patient.  She agrees to go to the ER if she were to feel suicidal.    Diagnosis:   Major depressive disorder, recurrent   ADHD    Treatment Plan:  The current treatment plan consists of a follow-up visit in 3 weeks for a psychiatric session designed to evaluate her depression and ADHD.    Duration will be for a minimum of 12 months and will be reviewed at each visit.    Goals: Remission of depression with improvement in ADHD symptoms in order to prevent relapse due to the chronic nature of her behavioral health problems and mental illness.  Continue Strattera 100 mg a.m. for now.  Start Celexa 10 mg at bedtime.  Possible side  effects including drowsiness discussed.    Juancarlos Morton M.D.      This note was created using voice recognition software (Dragon). The accuracy of the dictation is limited by the abilities of the software. I have reviewed the note prior to signing, however some errors in grammar and context are still possible. If you have any questions related to this note please do not hesitate to contact our office.

## 2022-09-15 ENCOUNTER — TELEPHONE (OUTPATIENT)
Dept: BEHAVIORAL HEALTH | Facility: CLINIC | Age: 22
End: 2022-09-15
Payer: COMMERCIAL

## 2022-09-15 NOTE — TELEPHONE ENCOUNTER
Caller Name: Jaleesa Fields  Call Back Number: [phone]      Patient called asking if you sent the Trazodone medication to her pharmacy. Please advise. I do not see a prescription for Trazodone.

## 2022-09-16 RX ORDER — TRAZODONE HYDROCHLORIDE 50 MG/1
50 TABLET ORAL NIGHTLY
Qty: 30 TABLET | Refills: 0 | Status: SHIPPED | OUTPATIENT
Start: 2022-09-16 | End: 2022-10-11

## 2022-10-07 ENCOUNTER — OFFICE VISIT (OUTPATIENT)
Dept: BEHAVIORAL HEALTH | Facility: CLINIC | Age: 22
End: 2022-10-07
Payer: COMMERCIAL

## 2022-10-07 DIAGNOSIS — F90.2 ATTENTION DEFICIT HYPERACTIVITY DISORDER, COMBINED TYPE: ICD-10-CM

## 2022-10-07 DIAGNOSIS — F33.1 MAJOR DEPRESSIVE DISORDER, RECURRENT EPISODE, MODERATE (HCC): ICD-10-CM

## 2022-10-07 PROCEDURE — 99214 OFFICE O/P EST MOD 30 MIN: CPT | Performed by: PSYCHIATRY & NEUROLOGY

## 2022-10-07 RX ORDER — CITALOPRAM 20 MG/1
20 TABLET ORAL NIGHTLY
Qty: 30 TABLET | Refills: 0 | Status: SHIPPED | OUTPATIENT
Start: 2022-10-07 | End: 2022-11-06

## 2022-10-07 RX ORDER — ATOMOXETINE 100 MG/1
100 CAPSULE ORAL DAILY
Qty: 30 CAPSULE | Refills: 0 | Status: SHIPPED | OUTPATIENT
Start: 2022-10-07 | End: 2022-11-06

## 2022-10-07 NOTE — PROGRESS NOTES
[facility]   Follow Up Assessment     This provider informed the patient their medical records are totally confidential except for the use by other providers involved in their care, or if the patient signs a release, or to report instances of child or elder abuse, or if it is determined they are an immediate risk to harm themselves or others.    Name: Jaleesa Fields  MRN: [id number]  : 2000  Age: 22 y.o.  Date of assessment: 10/7/2022  PCP: Pcp Pt States None      Subjective:  Chart reviewed prior to seeing her in my office.  She was seen most recently on .  She hates her job but has not checked out any other employment options.  She is anhedonic.  Thus far she has no significant improvement in her depression on Celexa 10 mg at bedtime and agrees to increase to 20 mg at bedtime.  She prefers to continue Strattera 100 mg a.m. for now.  He would like to have psychotherapy.  We talked about the current situation in this clinic.    Objective:  She is alert, oriented, and cooperative.  Relatedness is good.  Grooming is good.  Speech is normal rate.  Sad and anxious.  Memory is fairly good.  Insight and judgment are fairly good.  No indication of psychotic thinking.    Current Risk:       Suicidal: Not suicidal       Homicidal: Not homicidal       Self-Harm: No plan to harm self       Relapse: (Low/Moderate/High): Moderate       Crisis Safety Plan Reviewed: Discussed with patient    Diagnosis:   Major depressive disorder, recurrent  ADHD    Treatment Plan:  The current treatment plan consists of monthly psychiatric sessions designed to evaluate her depression and ADHD.    Duration will be for a minimum of 12 months and will be reviewed at each visit.    Goals: Remission of depression with improvement in ADHD symptoms.  He prefers to continue Strattera 100 mg a.m. for now.  Increase Celexa to 20 mg at bedtime.    Juancarlos Morton M.D.      This note was created using voice  recognition software (Dragon). The accuracy of the dictation is limited by the abilities of the software. I have reviewed the note prior to signing, however some errors in grammar and context are still possible. If you have any questions related to this note please do not hesitate to contact our office.

## 2022-10-11 RX ORDER — TRAZODONE HYDROCHLORIDE 50 MG/1
TABLET ORAL
Qty: 30 TABLET | Refills: 0 | Status: SHIPPED | OUTPATIENT
Start: 2022-10-11 | End: 2022-12-19

## 2022-12-19 RX ORDER — TRAZODONE HYDROCHLORIDE 50 MG/1
TABLET ORAL
Qty: 30 TABLET | Refills: 0 | Status: SHIPPED | OUTPATIENT
Start: 2022-12-19

## 2022-12-19 NOTE — TELEPHONE ENCOUNTER
Received request via: Pharmacy    Was the patient seen in the last year in this department? Yes    Does the patient have an active prescription (recently filled or refills available) for medication(s) requested? No    Does the patient have retirement Plus and need 100 day supply (blood pressure, diabetes and cholesterol meds only)? Medication is not for cholesterol, blood pressure or diabetes and Patient does not have SCP

## 2022-12-28 ENCOUNTER — APPOINTMENT (OUTPATIENT)
Dept: URGENT CARE | Facility: CLINIC | Age: 22
End: 2022-12-28
Payer: COMMERCIAL

## 2023-11-15 ENCOUNTER — APPOINTMENT (RX ONLY)
Dept: URBAN - METROPOLITAN AREA CLINIC 4 | Facility: CLINIC | Age: 23
Setting detail: DERMATOLOGY
End: 2023-11-15

## 2023-11-15 DIAGNOSIS — L70.0 ACNE VULGARIS: ICD-10-CM

## 2023-11-15 DIAGNOSIS — L23.9 ALLERGIC CONTACT DERMATITIS, UNSPECIFIED CAUSE: ICD-10-CM

## 2023-11-15 DIAGNOSIS — B07.8 OTHER VIRAL WARTS: ICD-10-CM

## 2023-11-15 PROBLEM — D48.5 NEOPLASM OF UNCERTAIN BEHAVIOR OF SKIN: Status: ACTIVE | Noted: 2023-11-15

## 2023-11-15 PROCEDURE — ? BIOPSY BY SHAVE METHOD

## 2023-11-15 PROCEDURE — ? COUNSELING

## 2023-11-15 PROCEDURE — 11102 TANGNTL BX SKIN SINGLE LES: CPT

## 2023-11-15 PROCEDURE — ? PRESCRIPTION

## 2023-11-15 PROCEDURE — 99203 OFFICE O/P NEW LOW 30 MIN: CPT | Mod: 25

## 2023-11-15 RX ORDER — TRIAMCINOLONE ACETONIDE 1 MG/G
CREAM TOPICAL
Qty: 30 | Refills: 3 | Status: ERX | COMMUNITY
Start: 2023-11-15

## 2023-11-15 RX ORDER — TRETIONIN 0.25 MG/G
1 CREAM TOPICAL
Qty: 20 | Refills: 3 | Status: ERX | COMMUNITY
Start: 2023-11-15

## 2023-11-15 RX ORDER — CLASCOTERONE 1 G/100G
1 CREAM TOPICAL
Qty: 60 | Refills: 3 | Status: ERX | COMMUNITY
Start: 2023-11-15

## 2023-11-15 RX ADMIN — TRETIONIN 1: 0.25 CREAM TOPICAL at 00:00

## 2023-11-15 RX ADMIN — CLASCOTERONE 1: 1 CREAM TOPICAL at 00:00

## 2023-11-15 RX ADMIN — TRIAMCINOLONE ACETONIDE: 1 CREAM TOPICAL at 00:00

## 2023-11-15 ASSESSMENT — LOCATION ZONE DERM
LOCATION ZONE: FACE
LOCATION ZONE: ARM

## 2023-11-15 ASSESSMENT — LOCATION SIMPLE DESCRIPTION DERM
LOCATION SIMPLE: RIGHT FOREARM
LOCATION SIMPLE: RIGHT CHEEK
LOCATION SIMPLE: LEFT CHEEK

## 2023-11-15 ASSESSMENT — LOCATION DETAILED DESCRIPTION DERM
LOCATION DETAILED: RIGHT SUPERIOR LATERAL BUCCAL CHEEK
LOCATION DETAILED: RIGHT VENTRAL LATERAL DISTAL FOREARM
LOCATION DETAILED: LEFT SUPERIOR CENTRAL BUCCAL CHEEK

## 2023-11-15 ASSESSMENT — BSA RASH: BSA RASH: 14

## 2023-11-15 ASSESSMENT — ITCH NUMERIC RATING SCALE: HOW SEVERE IS YOUR ITCHING?: 3

## 2023-11-15 NOTE — PROCEDURE: BIOPSY BY SHAVE METHOD
Detail Level: Detailed
Depth Of Biopsy: dermis
Was A Bandage Applied: Yes
Size Of Lesion In Cm: 0.3
X Size Of Lesion In Cm: 0
Biopsy Type: H and E
Biopsy Method: Personna blade
Anesthesia Type: 1% lidocaine with epinephrine
Anesthesia Volume In Cc: 2
Hemostasis: Drysol
Wound Care: Bacitracin
Dressing: bandage
Destruction After The Procedure: No
Type Of Destruction Used: Curettage
Curettage Text: The wound bed was treated with curettage after the biopsy was performed.
Cryotherapy Text: The wound bed was treated with cryotherapy after the biopsy was performed.
Electrodesiccation Text: The wound bed was treated with electrodesiccation after the biopsy was performed.
Electrodesiccation And Curettage Text: The wound bed was treated with electrodesiccation and curettage after the biopsy was performed.
Silver Nitrate Text: The wound bed was treated with silver nitrate after the biopsy was performed.
Lab: [id number]
Lab Facility: 
Consent: Written consent was obtained and risks were reviewed including but not limited to scarring, infection, bleeding, scabbing, incomplete removal, nerve damage and allergy to anesthesia.
Post-Care Instructions: I reviewed with the patient in detail post-care instructions. Patient is to keep the biopsy site dry overnight, and then apply bacitracin twice daily until healed. Patient may apply hydrogen peroxide soaks to remove any crusting.
Notification Instructions: Patient will be notified of biopsy results. However, patient instructed to call the office if not contacted within 2 weeks.
Billing Type: Third-Party Bill
Information: Selecting Yes will display possible errors in your note based on the variables you have selected. This validation is only offered as a suggestion for you. PLEASE NOTE THAT THE VALIDATION TEXT WILL BE REMOVED WHEN YOU FINALIZE YOUR NOTE. IF YOU WANT TO FAX A PRELIMINARY NOTE YOU WILL NEED TO TOGGLE THIS TO 'NO' IF YOU DO NOT WANT IT IN YOUR FAXED NOTE.

## 2023-11-15 NOTE — PROCEDURE: COUNSELING
Dapsone Pregnancy And Lactation Text: This medication is Pregnancy Category C and is not considered safe during pregnancy or breast feeding.
Birth Control Pills Pregnancy And Lactation Text: This medication should be avoided if pregnant and for the first 30 days post-partum.
Azelaic Acid Pregnancy And Lactation Text: This medication is considered safe during pregnancy and breast feeding.
Erythromycin Pregnancy And Lactation Text: This medication is Pregnancy Category B and is considered safe during pregnancy. It is also excreted in breast milk.
Spironolactone Counseling: Patient advised regarding risks of diarrhea, abdominal pain, hyperkalemia, birth defects (for female patients), liver toxicity and renal toxicity. The patient may need blood work to monitor liver and kidney function and potassium levels while on therapy. The patient verbalized understanding of the proper use and possible adverse effects of spironolactone.  All of the patient's questions and concerns were addressed.
Azithromycin Counseling:  I discussed with the patient the risks of azithromycin including but not limited to GI upset, allergic reaction, drug rash, diarrhea, and yeast infections.
Benzoyl Peroxide Pregnancy And Lactation Text: This medication is Pregnancy Category C. It is unknown if benzoyl peroxide is excreted in breast milk.
Sarecycline Counseling: Patient advised regarding possible photosensitivity and discoloration of the teeth, skin, lips, tongue and gums.  Patient instructed to avoid sunlight, if possible.  When exposed to sunlight, patients should wear protective clothing, sunglasses, and sunscreen.  The patient was instructed to call the office immediately if the following severe adverse effects occur:  hearing changes, easy bruising/bleeding, severe headache, or vision changes.  The patient verbalized understanding of the proper use and possible adverse effects of sarecycline.  All of the patient's questions and concerns were addressed.
Doxycycline Pregnancy And Lactation Text: This medication is Pregnancy Category D and not consider safe during pregnancy. It is also excreted in breast milk but is considered safe for shorter treatment courses.
Winlevi Counseling:  I discussed with the patient the risks of topical clascoterone including but not limited to erythema, scaling, itching, and stinging. Patient voiced their understanding.
Tazorac Counseling:  Patient advised that medication is irritating and drying.  Patient may need to apply sparingly and wash off after an hour before eventually leaving it on overnight.  The patient verbalized understanding of the proper use and possible adverse effects of tazorac.  All of the patient's questions and concerns were addressed.
Tetracycline Pregnancy And Lactation Text: This medication is Pregnancy Category D and not consider safe during pregnancy. It is also excreted in breast milk.
Include Pregnancy/Lactation Warning?: No
Spironolactone Pregnancy And Lactation Text: This medication can cause feminization of the male fetus and should be avoided during pregnancy. The active metabolite is also found in breast milk.
Isotretinoin Counseling: Patient should get monthly blood tests, not donate blood, not drive at night if vision affected, not share medication, and not undergo elective surgery for 6 months after tx completed. Side effects reviewed, pt to contact office should one occur.
Winlevi Pregnancy And Lactation Text: This medication is considered safe during pregnancy and breastfeeding.
Azithromycin Pregnancy And Lactation Text: This medication is considered safe during pregnancy and is also secreted in breast milk.
Topical Sulfur Applications Counseling: Topical Sulfur Counseling: Patient counseled that this medication may cause skin irritation or allergic reactions.  In the event of skin irritation, the patient was advised to reduce the amount of the drug applied or use it less frequently.   The patient verbalized understanding of the proper use and possible adverse effects of topical sulfur application.  All of the patient's questions and concerns were addressed.
Aklief Pregnancy And Lactation Text: It is unknown if this medication is safe to use during pregnancy.  It is unknown if this medication is excreted in breast milk.  Breastfeeding women should use the topical cream on the smallest area of the skin for the shortest time needed while breastfeeding.  Do not apply to nipple and areola.
Minocycline Counseling: Patient advised regarding possible photosensitivity and discoloration of the teeth, skin, lips, tongue and gums.  Patient instructed to avoid sunlight, if possible.  When exposed to sunlight, patients should wear protective clothing, sunglasses, and sunscreen.  The patient was instructed to call the office immediately if the following severe adverse effects occur:  hearing changes, easy bruising/bleeding, severe headache, or vision changes.  The patient verbalized understanding of the proper use and possible adverse effects of minocycline.  All of the patient's questions and concerns were addressed.
Topical Clindamycin Counseling: Patient counseled that this medication may cause skin irritation or allergic reactions.  In the event of skin irritation, the patient was advised to reduce the amount of the drug applied or use it less frequently.   The patient verbalized understanding of the proper use and possible adverse effects of clindamycin.  All of the patient's questions and concerns were addressed.
Bactrim Pregnancy And Lactation Text: This medication is Pregnancy Category D and is known to cause fetal risk.  It is also excreted in breast milk.
High Dose Vitamin A Counseling: Side effects reviewed, pt to contact office should one occur.
Benzoyl Peroxide Counseling: Patient counseled that medicine may cause skin irritation and bleach clothing.  In the event of skin irritation, the patient was advised to reduce the amount of the drug applied or use it less frequently.   The patient verbalized understanding of the proper use and possible adverse effects of benzoyl peroxide.  All of the patient's questions and concerns were addressed.
Dapsone Counseling: I discussed with the patient the risks of dapsone including but not limited to hemolytic anemia, agranulocytosis, rashes, methemoglobinemia, kidney failure, peripheral neuropathy, headaches, GI upset, and liver toxicity.  Patients who start dapsone require monitoring including baseline LFTs and weekly CBCs for the first month, then every month thereafter.  The patient verbalized understanding of the proper use and possible adverse effects of dapsone.  All of the patient's questions and concerns were addressed.
Topical Sulfur Applications Pregnancy And Lactation Text: This medication is Pregnancy Category C and has an unknown safety profile during pregnancy. It is unknown if this topical medication is excreted in breast milk.
Topical Clindamycin Pregnancy And Lactation Text: This medication is Pregnancy Category B and is considered safe during pregnancy. It is unknown if it is excreted in breast milk.
Azelaic Acid Counseling: Patient counseled that medicine may cause skin irritation and to avoid applying near the eyes.  In the event of skin irritation, the patient was advised to reduce the amount of the drug applied or use it less frequently.   The patient verbalized understanding of the proper use and possible adverse effects of azelaic acid.  All of the patient's questions and concerns were addressed.
Erythromycin Counseling:  I discussed with the patient the risks of erythromycin including but not limited to GI upset, allergic reaction, drug rash, diarrhea, increase in liver enzymes, and yeast infections.
Doxycycline Counseling:  Patient counseled regarding possible photosensitivity and increased risk for sunburn.  Patient instructed to avoid sunlight, if possible.  When exposed to sunlight, patients should wear protective clothing, sunglasses, and sunscreen.  The patient was instructed to call the office immediately if the following severe adverse effects occur:  hearing changes, easy bruising/bleeding, severe headache, or vision changes.  The patient verbalized understanding of the proper use and possible adverse effects of doxycycline.  All of the patient's questions and concerns were addressed.
Topical Retinoid counseling:  Patient advised to apply a pea-sized amount only at bedtime and wait 30 minutes after washing their face before applying.  If too drying, patient may add a non-comedogenic moisturizer. The patient verbalized understanding of the proper use and possible adverse effects of retinoids.  All of the patient's questions and concerns were addressed.
Bactrim Counseling:  I discussed with the patient the risks of sulfa antibiotics including but not limited to GI upset, allergic reaction, drug rash, diarrhea, dizziness, photosensitivity, and yeast infections.  Rarely, more serious reactions can occur including but not limited to aplastic anemia, agranulocytosis, methemoglobinemia, blood dyscrasias, liver or kidney failure, lung infiltrates or desquamative/blistering drug rashes.
Tazorac Pregnancy And Lactation Text: This medication is not safe during pregnancy. It is unknown if this medication is excreted in breast milk.
Detail Level: Detailed
Topical Retinoid Pregnancy And Lactation Text: This medication is Pregnancy Category C. It is unknown if this medication is excreted in breast milk.
Tetracycline Counseling: Patient counseled regarding possible photosensitivity and increased risk for sunburn.  Patient instructed to avoid sunlight, if possible.  When exposed to sunlight, patients should wear protective clothing, sunglasses, and sunscreen.  The patient was instructed to call the office immediately if the following severe adverse effects occur:  hearing changes, easy bruising/bleeding, severe headache, or vision changes.  The patient verbalized understanding of the proper use and possible adverse effects of tetracycline.  All of the patient's questions and concerns were addressed. Patient understands to avoid pregnancy while on therapy due to potential birth defects.
High Dose Vitamin A Pregnancy And Lactation Text: High dose vitamin A therapy is contraindicated during pregnancy and breast feeding.
Aklief counseling:  Patient advised to apply a pea-sized amount only at bedtime and wait 30 minutes after washing their face before applying.  If too drying, patient may add a non-comedogenic moisturizer.  The most commonly reported side effects including irritation, redness, scaling, dryness, stinging, burning, itching, and increased risk of sunburn.  The patient verbalized understanding of the proper use and possible adverse effects of retinoids.  All of the patient's questions and concerns were addressed.
Birth Control Pills Counseling: Birth Control Pill Counseling: I discussed with the patient the potential side effects of OCPs including but not limited to increased risk of stroke, heart attack, thrombophlebitis, deep venous thrombosis, hepatic adenomas, breast changes, GI upset, headaches, and depression.  The patient verbalized understanding of the proper use and possible adverse effects of OCPs. All of the patient's questions and concerns were addressed.
Isotretinoin Pregnancy And Lactation Text: This medication is Pregnancy Category X and is considered extremely dangerous during pregnancy. It is unknown if it is excreted in breast milk.

## 2024-07-26 ENCOUNTER — APPOINTMENT (RX ONLY)
Dept: URBAN - METROPOLITAN AREA CLINIC 4 | Facility: CLINIC | Age: 24
Setting detail: DERMATOLOGY
End: 2024-07-26

## 2024-07-26 DIAGNOSIS — L72.8 OTHER FOLLICULAR CYSTS OF THE SKIN AND SUBCUTANEOUS TISSUE: ICD-10-CM

## 2024-07-26 DIAGNOSIS — L70.0 ACNE VULGARIS: ICD-10-CM

## 2024-07-26 PROCEDURE — 99213 OFFICE O/P EST LOW 20 MIN: CPT

## 2024-07-26 PROCEDURE — ? ADDITIONAL NOTES

## 2024-07-26 PROCEDURE — ? PRESCRIPTION

## 2024-07-26 PROCEDURE — ? COUNSELING

## 2024-07-26 RX ORDER — TRETIONIN 1 MG/G
CREAM TOPICAL
Qty: 45 | Refills: 3 | Status: ERX | COMMUNITY
Start: 2024-07-26

## 2024-07-26 RX ADMIN — TRETIONIN: 1 CREAM TOPICAL at 00:00

## 2024-07-26 ASSESSMENT — LOCATION DETAILED DESCRIPTION DERM
LOCATION DETAILED: RIGHT SUPERIOR LATERAL BUCCAL CHEEK
LOCATION DETAILED: LEFT SUPERIOR LATERAL MALAR CHEEK
LOCATION DETAILED: LEFT SUPERIOR CENTRAL BUCCAL CHEEK

## 2024-07-26 ASSESSMENT — LOCATION SIMPLE DESCRIPTION DERM
LOCATION SIMPLE: LEFT CHEEK
LOCATION SIMPLE: RIGHT CHEEK

## 2024-07-26 ASSESSMENT — LOCATION ZONE DERM: LOCATION ZONE: FACE

## 2024-07-26 NOTE — PROCEDURE: ADDITIONAL NOTES
Render Risk Assessment In Note?: no
Detail Level: Simple
Additional Notes: Discussed with pt that if she’s not clearing with Tretinoin 0.1% that we can increase the dose on Spironolactone. Discussed with pt that for the scarring she can get a laser treatment.
Additional Notes: Discussed with we can extract it today but it could leave a divot. Pt will think about it adm RTC to clinic if she wants cyst removed.